# Patient Record
Sex: MALE | Race: WHITE | NOT HISPANIC OR LATINO | Employment: STUDENT | URBAN - NONMETROPOLITAN AREA
[De-identification: names, ages, dates, MRNs, and addresses within clinical notes are randomized per-mention and may not be internally consistent; named-entity substitution may affect disease eponyms.]

---

## 2019-07-09 ENCOUNTER — OFFICE VISIT (OUTPATIENT)
Dept: RETAIL CLINIC | Facility: CLINIC | Age: 24
End: 2019-07-09

## 2019-07-09 VITALS
WEIGHT: 131 LBS | RESPIRATION RATE: 16 BRPM | HEART RATE: 97 BPM | OXYGEN SATURATION: 99 % | SYSTOLIC BLOOD PRESSURE: 122 MMHG | BODY MASS INDEX: 21.8 KG/M2 | TEMPERATURE: 101.6 F | DIASTOLIC BLOOD PRESSURE: 82 MMHG

## 2019-07-09 DIAGNOSIS — J02.9 SORE THROAT: Primary | ICD-10-CM

## 2019-07-09 PROBLEM — F98.8 ADD (ATTENTION DEFICIT DISORDER) WITHOUT HYPERACTIVITY: Status: ACTIVE | Noted: 2019-07-09

## 2019-07-09 PROCEDURE — 99203 OFFICE O/P NEW LOW 30 MIN: CPT | Performed by: NURSE PRACTITIONER

## 2019-07-09 RX ORDER — ACETAMINOPHEN 325 MG/1
650 TABLET ORAL ONCE
Status: COMPLETED | OUTPATIENT
Start: 2019-07-09 | End: 2019-07-09

## 2019-07-09 RX ORDER — CEFDINIR 300 MG/1
300 CAPSULE ORAL 2 TIMES DAILY
Qty: 20 CAPSULE | Refills: 0 | Status: SHIPPED | OUTPATIENT
Start: 2019-07-09 | End: 2019-07-19

## 2019-07-09 RX ADMIN — ACETAMINOPHEN 650 MG: 325 TABLET ORAL at 12:15

## 2019-07-09 NOTE — PROGRESS NOTES
Subjective   Sedrick Coombs is a 24 y.o. male.     URI    This is a new problem. The current episode started yesterday. The problem has been gradually worsening. Maximum temperature: up to 101.2. Associated symptoms include congestion, coughing, ear pain, headaches, sinus pain and a sore throat. Pertinent negatives include no abdominal pain, chest pain, diarrhea, nausea, plugged ear sensation, rash, rhinorrhea, sneezing, swollen glands, vomiting or wheezing. Treatments tried: advil last night; nothing today. The treatment provided mild relief.        No current outpatient medications on file prior to visit.     No current facility-administered medications on file prior to visit.        Allergies   Allergen Reactions   • Amoxicillin Rash       Past Medical History:   Diagnosis Date   • ADHD (attention deficit hyperactivity disorder)    • Strep pharyngitis        Past Surgical History:   Procedure Laterality Date   • TONSILLECTOMY     • WISDOM TOOTH EXTRACTION         History reviewed. No pertinent family history.    Social History     Socioeconomic History   • Marital status: Single     Spouse name: Not on file   • Number of children: Not on file   • Years of education: Not on file   • Highest education level: Not on file   Tobacco Use   • Smoking status: Never Smoker   • Smokeless tobacco: Never Used   Substance and Sexual Activity   • Alcohol use: Yes     Comment: Socially   • Drug use: No   • Sexual activity: Defer       Review of Systems   Constitutional: Positive for activity change, chills, diaphoresis and fever. Negative for appetite change.   HENT: Positive for congestion, ear pain, sinus pain and sore throat. Negative for rhinorrhea and sneezing.    Respiratory: Positive for cough. Negative for wheezing.    Cardiovascular: Negative for chest pain.   Gastrointestinal: Negative for abdominal pain, diarrhea, nausea and vomiting.   Skin: Negative for rash.   Neurological: Positive for headaches.       /82    Pulse 97   Temp (!) 101.6 °F (38.7 °C)   Resp 16   Wt 59.4 kg (131 lb)   SpO2 99%   BMI 21.80 kg/m²     Objective   Physical Exam   Constitutional: He is oriented to person, place, and time. He appears well-developed and well-nourished. He is cooperative.   HENT:   Head: Normocephalic.   Right Ear: Tympanic membrane, external ear and ear canal normal.   Left Ear: Tympanic membrane, external ear and ear canal normal.   Nose: Right sinus exhibits no maxillary sinus tenderness and no frontal sinus tenderness. Left sinus exhibits no maxillary sinus tenderness and no frontal sinus tenderness.   Mouth/Throat: Uvula is midline and mucous membranes are normal. Oropharyngeal exudate and posterior oropharyngeal erythema present. Tonsils are 0 on the right. Tonsils are 0 on the left.   Eyes: Conjunctivae, EOM and lids are normal.   Cardiovascular: Normal rate, regular rhythm and normal heart sounds.   Pulmonary/Chest: Effort normal and breath sounds normal. No accessory muscle usage. No respiratory distress.   Lymphadenopathy:        Head (right side): Tonsillar adenopathy present.        Head (left side): Tonsillar adenopathy present.   Neurological: He is alert and oriented to person, place, and time.   Skin: Skin is warm, dry and intact.   Psychiatric: He has a normal mood and affect. His speech is normal and behavior is normal.         Assessment/Plan   Sedrick was seen today for uri.    Diagnoses and all orders for this visit:    Sore throat  -     cefdinir (OMNICEF) 300 MG capsule; Take 1 capsule by mouth 2 (Two) Times a Day for 10 days.  -     acetaminophen (TYLENOL) tablet 650 mg      Administrations This Visit     acetaminophen (TYLENOL) tablet 650 mg     Admin Date  07/09/2019 Action  Given Dose  650 mg Route  Oral Administered By  Shea Zaidi APRN                  Will treat for possible strep due to frequent history of strep as well as PE. Patient reports taking keflex in the past without  reaction.     Follow up with PCP or go to the nearest emergency room if symptoms worsen or fail to improve.

## 2024-01-31 ENCOUNTER — APPOINTMENT (OUTPATIENT)
Dept: GENERAL RADIOLOGY | Facility: HOSPITAL | Age: 29
End: 2024-01-31
Payer: COMMERCIAL

## 2024-01-31 ENCOUNTER — HOSPITAL ENCOUNTER (EMERGENCY)
Facility: HOSPITAL | Age: 29
Discharge: OTHER INSTITUTION | End: 2024-02-01
Attending: EMERGENCY MEDICINE
Payer: COMMERCIAL

## 2024-01-31 ENCOUNTER — APPOINTMENT (OUTPATIENT)
Dept: CT IMAGING | Facility: HOSPITAL | Age: 29
End: 2024-01-31
Payer: COMMERCIAL

## 2024-01-31 DIAGNOSIS — R58 RETROPERITONEAL HEMORRHAGE: Primary | ICD-10-CM

## 2024-01-31 LAB
ABO GROUP BLD: NORMAL
ABO GROUP BLD: NORMAL
ALBUMIN SERPL-MCNC: 4.6 G/DL (ref 3.5–5.2)
ALBUMIN/GLOB SERPL: 1.8 G/DL
ALP SERPL-CCNC: 91 U/L (ref 39–117)
ALT SERPL W P-5'-P-CCNC: 47 U/L (ref 1–41)
ANION GAP SERPL CALCULATED.3IONS-SCNC: 16.7 MMOL/L (ref 5–15)
AST SERPL-CCNC: 49 U/L (ref 1–40)
BASOPHILS # BLD AUTO: 0.04 10*3/MM3 (ref 0–0.2)
BASOPHILS NFR BLD AUTO: 0.2 % (ref 0–1.5)
BILIRUB SERPL-MCNC: 0.7 MG/DL (ref 0–1.2)
BLD GP AB SCN SERPL QL: NEGATIVE
BUN SERPL-MCNC: 14 MG/DL (ref 6–20)
BUN/CREAT SERPL: 17.1 (ref 7–25)
CALCIUM SPEC-SCNC: 8.7 MG/DL (ref 8.6–10.5)
CHLORIDE SERPL-SCNC: 97 MMOL/L (ref 98–107)
CO2 SERPL-SCNC: 22.3 MMOL/L (ref 22–29)
CREAT SERPL-MCNC: 0.82 MG/DL (ref 0.76–1.27)
CRP SERPL-MCNC: 3.29 MG/DL (ref 0–0.5)
D-LACTATE SERPL-SCNC: 1.1 MMOL/L (ref 0.5–2)
D-LACTATE SERPL-SCNC: 3.4 MMOL/L (ref 0.5–2)
DEPRECATED RDW RBC AUTO: 37.9 FL (ref 37–54)
EGFRCR SERPLBLD CKD-EPI 2021: 121.2 ML/MIN/1.73
EOSINOPHIL # BLD AUTO: 0.01 10*3/MM3 (ref 0–0.4)
EOSINOPHIL NFR BLD AUTO: 0.1 % (ref 0.3–6.2)
ERYTHROCYTE [DISTWIDTH] IN BLOOD BY AUTOMATED COUNT: 13 % (ref 12.3–15.4)
ERYTHROCYTE [SEDIMENTATION RATE] IN BLOOD: 5 MM/HR (ref 0–15)
FLUAV RNA RESP QL NAA+PROBE: DETECTED
FLUBV RNA RESP QL NAA+PROBE: NOT DETECTED
GEN 5 2HR TROPONIN T REFLEX: 54 NG/L
GLOBULIN UR ELPH-MCNC: 2.6 GM/DL
GLUCOSE SERPL-MCNC: 129 MG/DL (ref 65–99)
HCT VFR BLD AUTO: 25.3 % (ref 37.5–51)
HCT VFR BLD AUTO: 27.7 % (ref 37.5–51)
HCT VFR BLD AUTO: 31.8 % (ref 37.5–51)
HGB BLD-MCNC: 11.3 G/DL (ref 13–17.7)
HGB BLD-MCNC: 9.2 G/DL (ref 13–17.7)
HGB BLD-MCNC: 9.8 G/DL (ref 13–17.7)
HOLD SPECIMEN: NORMAL
HOLD SPECIMEN: NORMAL
IMM GRANULOCYTES # BLD AUTO: 0.16 10*3/MM3 (ref 0–0.05)
IMM GRANULOCYTES NFR BLD AUTO: 0.8 % (ref 0–0.5)
LIPASE SERPL-CCNC: 10 U/L (ref 13–60)
LYMPHOCYTES # BLD AUTO: 1.38 10*3/MM3 (ref 0.7–3.1)
LYMPHOCYTES NFR BLD AUTO: 7.3 % (ref 19.6–45.3)
MCH RBC QN AUTO: 28.8 PG (ref 26.6–33)
MCHC RBC AUTO-ENTMCNC: 35.5 G/DL (ref 31.5–35.7)
MCV RBC AUTO: 80.9 FL (ref 79–97)
MONOCYTES # BLD AUTO: 0.98 10*3/MM3 (ref 0.1–0.9)
MONOCYTES NFR BLD AUTO: 5.2 % (ref 5–12)
NEUTROPHILS NFR BLD AUTO: 16.41 10*3/MM3 (ref 1.7–7)
NEUTROPHILS NFR BLD AUTO: 86.4 % (ref 42.7–76)
NRBC BLD AUTO-RTO: 0 /100 WBC (ref 0–0.2)
NT-PROBNP SERPL-MCNC: 258.7 PG/ML (ref 0–450)
PLATELET # BLD AUTO: 322 10*3/MM3 (ref 140–450)
PMV BLD AUTO: 11.5 FL (ref 6–12)
POTASSIUM SERPL-SCNC: 4.2 MMOL/L (ref 3.5–5.2)
PROCALCITONIN SERPL-MCNC: 0.5 NG/ML (ref 0–0.25)
PROT SERPL-MCNC: 7.2 G/DL (ref 6–8.5)
RBC # BLD AUTO: 3.93 10*6/MM3 (ref 4.14–5.8)
RH BLD: POSITIVE
RH BLD: POSITIVE
SARS-COV-2 RNA RESP QL NAA+PROBE: NOT DETECTED
SODIUM SERPL-SCNC: 136 MMOL/L (ref 136–145)
T&S EXPIRATION DATE: NORMAL
TROPONIN T DELTA: -14 NG/L
TROPONIN T SERPL HS-MCNC: 68 NG/L
WBC NRBC COR # BLD AUTO: 18.98 10*3/MM3 (ref 3.4–10.8)
WHOLE BLOOD HOLD COAG: NORMAL
WHOLE BLOOD HOLD SPECIMEN: NORMAL

## 2024-01-31 PROCEDURE — 96361 HYDRATE IV INFUSION ADD-ON: CPT

## 2024-01-31 PROCEDURE — 71275 CT ANGIOGRAPHY CHEST: CPT

## 2024-01-31 PROCEDURE — 25010000002 HYDROMORPHONE 1 MG/ML SOLUTION: Performed by: STUDENT IN AN ORGANIZED HEALTH CARE EDUCATION/TRAINING PROGRAM

## 2024-01-31 PROCEDURE — 86901 BLOOD TYPING SEROLOGIC RH(D): CPT | Performed by: PHYSICIAN ASSISTANT

## 2024-01-31 PROCEDURE — 96376 TX/PRO/DX INJ SAME DRUG ADON: CPT

## 2024-01-31 PROCEDURE — 85652 RBC SED RATE AUTOMATED: CPT | Performed by: EMERGENCY MEDICINE

## 2024-01-31 PROCEDURE — 86140 C-REACTIVE PROTEIN: CPT | Performed by: EMERGENCY MEDICINE

## 2024-01-31 PROCEDURE — P9016 RBC LEUKOCYTES REDUCED: HCPCS

## 2024-01-31 PROCEDURE — 93005 ELECTROCARDIOGRAM TRACING: CPT | Performed by: EMERGENCY MEDICINE

## 2024-01-31 PROCEDURE — 99285 EMERGENCY DEPT VISIT HI MDM: CPT

## 2024-01-31 PROCEDURE — 83690 ASSAY OF LIPASE: CPT | Performed by: EMERGENCY MEDICINE

## 2024-01-31 PROCEDURE — 86850 RBC ANTIBODY SCREEN: CPT | Performed by: PHYSICIAN ASSISTANT

## 2024-01-31 PROCEDURE — 85025 COMPLETE CBC W/AUTO DIFF WBC: CPT | Performed by: EMERGENCY MEDICINE

## 2024-01-31 PROCEDURE — 80053 COMPREHEN METABOLIC PANEL: CPT | Performed by: EMERGENCY MEDICINE

## 2024-01-31 PROCEDURE — 86900 BLOOD TYPING SEROLOGIC ABO: CPT

## 2024-01-31 PROCEDURE — 25010000002 MORPHINE PER 10 MG: Performed by: EMERGENCY MEDICINE

## 2024-01-31 PROCEDURE — 86901 BLOOD TYPING SEROLOGIC RH(D): CPT

## 2024-01-31 PROCEDURE — 83880 ASSAY OF NATRIURETIC PEPTIDE: CPT | Performed by: EMERGENCY MEDICINE

## 2024-01-31 PROCEDURE — 25810000003 SODIUM CHLORIDE 0.9 % SOLUTION: Performed by: PHYSICIAN ASSISTANT

## 2024-01-31 PROCEDURE — 96375 TX/PRO/DX INJ NEW DRUG ADDON: CPT

## 2024-01-31 PROCEDURE — 93005 ELECTROCARDIOGRAM TRACING: CPT

## 2024-01-31 PROCEDURE — 86920 COMPATIBILITY TEST SPIN: CPT

## 2024-01-31 PROCEDURE — 25010000002 ONDANSETRON PER 1 MG: Performed by: EMERGENCY MEDICINE

## 2024-01-31 PROCEDURE — 71045 X-RAY EXAM CHEST 1 VIEW: CPT

## 2024-01-31 PROCEDURE — 36430 TRANSFUSION BLD/BLD COMPNT: CPT

## 2024-01-31 PROCEDURE — 36415 COLL VENOUS BLD VENIPUNCTURE: CPT

## 2024-01-31 PROCEDURE — 87636 SARSCOV2 & INF A&B AMP PRB: CPT | Performed by: PHYSICIAN ASSISTANT

## 2024-01-31 PROCEDURE — 85014 HEMATOCRIT: CPT | Performed by: PHYSICIAN ASSISTANT

## 2024-01-31 PROCEDURE — 85018 HEMOGLOBIN: CPT | Performed by: PHYSICIAN ASSISTANT

## 2024-01-31 PROCEDURE — 74174 CTA ABD&PLVS W/CONTRAST: CPT

## 2024-01-31 PROCEDURE — 84145 PROCALCITONIN (PCT): CPT | Performed by: PHYSICIAN ASSISTANT

## 2024-01-31 PROCEDURE — 25510000001 IOPAMIDOL 61 % SOLUTION: Performed by: EMERGENCY MEDICINE

## 2024-01-31 PROCEDURE — 96374 THER/PROPH/DIAG INJ IV PUSH: CPT

## 2024-01-31 PROCEDURE — 25010000002 HYDROMORPHONE 1 MG/ML SOLUTION: Performed by: EMERGENCY MEDICINE

## 2024-01-31 PROCEDURE — 83605 ASSAY OF LACTIC ACID: CPT | Performed by: PHYSICIAN ASSISTANT

## 2024-01-31 PROCEDURE — 84484 ASSAY OF TROPONIN QUANT: CPT | Performed by: EMERGENCY MEDICINE

## 2024-01-31 PROCEDURE — 86900 BLOOD TYPING SEROLOGIC ABO: CPT | Performed by: PHYSICIAN ASSISTANT

## 2024-01-31 RX ORDER — AZATHIOPRINE 50 MG/1
50 TABLET ORAL DAILY
COMMUNITY
Start: 2023-08-23

## 2024-01-31 RX ORDER — ONDANSETRON 2 MG/ML
4 INJECTION INTRAMUSCULAR; INTRAVENOUS ONCE
Status: COMPLETED | OUTPATIENT
Start: 2024-01-31 | End: 2024-01-31

## 2024-01-31 RX ORDER — SODIUM CHLORIDE 0.9 % (FLUSH) 0.9 %
10 SYRINGE (ML) INJECTION AS NEEDED
Status: DISCONTINUED | OUTPATIENT
Start: 2024-01-31 | End: 2024-02-01 | Stop reason: HOSPADM

## 2024-01-31 RX ADMIN — MORPHINE SULFATE 4 MG: 4 INJECTION, SOLUTION INTRAMUSCULAR; INTRAVENOUS at 12:02

## 2024-01-31 RX ADMIN — SODIUM CHLORIDE 1000 ML: 9 INJECTION, SOLUTION INTRAVENOUS at 14:49

## 2024-01-31 RX ADMIN — IOPAMIDOL 100 ML: 612 INJECTION, SOLUTION INTRAVENOUS at 12:53

## 2024-01-31 RX ADMIN — IOPAMIDOL 100 ML: 612 INJECTION, SOLUTION INTRAVENOUS at 13:33

## 2024-01-31 RX ADMIN — HYDROMORPHONE HYDROCHLORIDE 1 MG: 1 INJECTION, SOLUTION INTRAMUSCULAR; INTRAVENOUS; SUBCUTANEOUS at 13:16

## 2024-01-31 RX ADMIN — HYDROMORPHONE HYDROCHLORIDE 1 MG: 1 INJECTION, SOLUTION INTRAMUSCULAR; INTRAVENOUS; SUBCUTANEOUS at 21:41

## 2024-01-31 RX ADMIN — HYDROMORPHONE HYDROCHLORIDE 1 MG: 1 INJECTION, SOLUTION INTRAMUSCULAR; INTRAVENOUS; SUBCUTANEOUS at 18:00

## 2024-01-31 RX ADMIN — ONDANSETRON 4 MG: 2 INJECTION INTRAMUSCULAR; INTRAVENOUS at 12:02

## 2024-01-31 NOTE — ED NOTES
[facility] returned call and asked to speak with Charles RAZO. Call transferred to Charles RAZO at this time

## 2024-01-31 NOTE — ED NOTES
Contacted [facility] per Charles RAZO request. Awaiting call back at this time.

## 2024-01-31 NOTE — ED PROVIDER NOTES
"Subjective  History of Present Illness:    Chief Complaint: Shortness of air  History of Present Illness: 28-year-old male presenting to the emergency department with complaints of shortness of air that began last night.  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  He is experiencing chest and shoulder pain with coughing and movement.  He describes the pain as shooting.  States he feels like his heart is racing.  He mentions he was experiencing flulike symptoms last week, however now resolved.  Onset: Sudden  Duration: Started last night  Exacerbating / Alleviating factors: Pain and SOA coughing and movement  Associated symptoms:       Nurses Notes reviewed and agree, including vitals, allergies, social history and prior medical history.     REVIEW OF SYSTEMS: All systems reviewed and not pertinent unless noted.    Review of Systems   Respiratory:  Positive for chest tightness and shortness of breath.    All other systems reviewed and are negative.      Past Medical History:   Diagnosis Date    ADHD (attention deficit hyperactivity disorder)     Strep pharyngitis        Allergies:    Amoxicillin      Past Surgical History:   Procedure Laterality Date    TONSILLECTOMY      WISDOM TOOTH EXTRACTION           Social History     Socioeconomic History    Marital status: Single   Tobacco Use    Smoking status: Never    Smokeless tobacco: Never   Substance and Sexual Activity    Alcohol use: Yes     Comment: Socially    Drug use: No    Sexual activity: Defer         History reviewed. No pertinent family history.    Objective  Physical Exam:  /75   Pulse (!) 123   Temp 98.1 °F (36.7 °C) (Oral)   Resp 18   Ht 165.1 cm (65\")   Wt 63.5 kg (140 lb)   SpO2 93%   BMI 23.30 kg/m²      Physical Exam  Vitals and nursing note reviewed.   Constitutional:       Appearance: He is " well-developed.   HENT:      Head: Normocephalic and atraumatic.      Mouth/Throat:      Mouth: Mucous membranes are moist.   Eyes:      Extraocular Movements: Extraocular movements intact.   Cardiovascular:      Rate and Rhythm: Normal rate and regular rhythm.   Pulmonary:      Effort: Pulmonary effort is normal.      Breath sounds: Normal breath sounds.   Abdominal:      Palpations: Abdomen is soft.   Musculoskeletal:         General: Normal range of motion.      Cervical back: Normal range of motion.   Skin:     General: Skin is warm and dry.   Neurological:      Mental Status: He is alert and oriented to person, place, and time.   Psychiatric:         Behavior: Behavior normal.         Thought Content: Thought content normal.         Judgment: Judgment normal.           Procedures    ED Course:    ED Course as of 01/31/24 2234 Wed Jan 31, 2024   1133 EKG interpreted by me: Sinus tachycardia, no acute ST elevations, some nonspecific T waves, this is an abnormal EKG [MP]   1330 Received a call from radiology, there is concern for blood intra-abdominal he, patient was sent back over for CT abdomen pelvis [CS]   1348 Discussed the case with Dr. Carrizales, he reviewed the CT scans as well, will reassess after CTA abdomen pelvis performed [CS]   1402 Received a call from radiologist, patient has intraperitoneal and retroperitoneal blood, no obvious source active extravasation seen, CT scans power shared with , reaching out to [facility] for transfer [CS]   1420 Discussed the case with Dr. Torres  transfer center patient does have stable vitals, no active extract, they are on divert and cannot accept the patient at this time [CS]   7534 Received a call from [facility], the physician was from interventional radiology, he will speak to his attending from interventional radiology and call back [CS]   1619 The case with Dr. Higginbotham, interventional radiologist, he thought he would be  available as a consult, he will be I will speak to general surgery as well as the intensivist as the primary team before excepting. [CS]   1750 Dr. Hunter at [facility] general surgery recommended reaching out to [facility], and calling back [CS]   1801 Discussed with Dr. Lee, General Surgery at [facility], he felt the patient would benefit from a tertiary center, higher level care, academic center. [CS]   1806 I have reached back out to [facility] since the patient's hemoglobin hematocrit have dropped and this would indicate less stability. [CS]   1812 Discussed with Dr. Torres,  transfer hospitalist, despite the patient's hemoglobin hematocrit dropping, they are unable to accept this patient as a transfer. [CS]   1849 Discussed the case with Dr. Hunter [facility] surgery he agreed to accept the patient and recommended admitting to the ICU, I spoke to Dr. arce with the ICU, she excepted on behalf of Dr. Sims, we are waiting a bed assignment [CS]      ED Course User Index  [CS] Charles Leach Jr., DANNI  [MP] Jose D Carrizales MD       Lab Results (last 24 hours)       Procedure Component Value Units Date/Time    CBC & Differential [[id number]]  (Abnormal) Collected: 01/31/24 1120    Specimen: Blood Updated: 01/31/24 1212    Narrative:      The following orders were created for panel order CBC & Differential.  Procedure                               Abnormality         Status                     ---------                               -----------         ------                     CBC Auto Differential[[id number]]        Abnormal            Final result               Scan Slide[[id number]]                                                                    Please view results for these tests on the individual orders.    Comprehensive Metabolic Panel [[id number]]  (Abnormal) Collected: 01/31/24 1120     Specimen: Blood Updated: 01/31/24 1144     Glucose 129 mg/dL      BUN 14 mg/dL      Creatinine 0.82 mg/dL      Sodium 136 mmol/L      Potassium 4.2 mmol/L      Chloride 97 mmol/L      CO2 22.3 mmol/L      Calcium 8.7 mg/dL      Total Protein 7.2 g/dL      Albumin 4.6 g/dL      ALT (SGPT) 47 U/L      AST (SGOT) 49 U/L      Alkaline Phosphatase 91 U/L      Total Bilirubin 0.7 mg/dL      Globulin 2.6 gm/dL      A/G Ratio 1.8 g/dL      BUN/Creatinine Ratio 17.1     Anion Gap 16.7 mmol/L      eGFR 121.2 mL/min/1.73     Narrative:      GFR Normal >60  Chronic Kidney Disease <60  Kidney Failure <15      BNP [[id number]]  (Normal) Collected: 01/31/24 1120    Specimen: Blood Updated: 01/31/24 1147     proBNP 258.7 pg/mL     Narrative:      This assay is used as an aid in the diagnosis of individuals suspected of having heart failure. It can be used as an aid in the diagnosis of acute decompensated heart failure (ADHF) in patients presenting with signs and symptoms of ADHF to the emergency department (ED). In addition, NT-proBNP of <300 pg/mL indicates ADHF is not likely.    Age Range Result Interpretation  NT-proBNP Concentration (pg/mL:      <50             Positive            >450                   Gray                 300-450                    Negative             <300    50-75           Positive            >900                  Gray                300-900                  Negative            <300      >75             Positive            >1800                  Gray                300-1800                  Negative            <300    Single High Sensitivity Troponin T [[id number]]  (Abnormal) Collected: 01/31/24 1120    Specimen: Blood Updated: 01/31/24 1210     HS Troponin T 68 ng/L     Narrative:      High Sensitive Troponin T Reference Range:  <14.0 ng/L- Negative Female for AMI  <22.0 ng/L- Negative Male for AMI  >=14 - Abnormal Female indicating possible myocardial injury.  >=22 - Abnormal Male indicating possible  myocardial injury.   Clinicians would have to utilize clinical acumen, EKG, Troponin, and serial changes to determine if it is an Acute Myocardial Infarction or myocardial injury due to an underlying chronic condition.         CBC Auto Differential [[id number]]  (Abnormal) Collected: 01/31/24 1120    Specimen: Blood Updated: 01/31/24 1212     WBC 18.98 10*3/mm3      RBC 3.93 10*6/mm3      Hemoglobin 11.3 g/dL      Hematocrit 31.8 %      MCV 80.9 fL      MCH 28.8 pg      MCHC 35.5 g/dL      RDW 13.0 %      RDW-SD 37.9 fl      MPV 11.5 fL      Platelets 322 10*3/mm3      Neutrophil % 86.4 %      Lymphocyte % 7.3 %      Monocyte % 5.2 %      Eosinophil % 0.1 %      Basophil % 0.2 %      Immature Grans % 0.8 %      Neutrophils, Absolute 16.41 10*3/mm3      Lymphocytes, Absolute 1.38 10*3/mm3      Monocytes, Absolute 0.98 10*3/mm3      Eosinophils, Absolute 0.01 10*3/mm3      Basophils, Absolute 0.04 10*3/mm3      Immature Grans, Absolute 0.16 10*3/mm3      nRBC 0.0 /100 WBC     Sedimentation Rate [[id number]]  (Normal) Collected: 01/31/24 1120    Specimen: Blood Updated: 01/31/24 1205     Sed Rate 5 mm/hr     C-reactive Protein [[id number]]  (Abnormal) Collected: 01/31/24 1120    Specimen: Blood Updated: 01/31/24 1206     C-Reactive Protein 3.29 mg/dL     Procalcitonin [[id number]]  (Abnormal) Collected: 01/31/24 1120    Specimen: Blood Updated: 01/31/24 1300     Procalcitonin 0.50 ng/mL     Narrative:      As a Marker for Sepsis (Non-Neonates):    1. <0.5 ng/mL represents a low risk of severe sepsis and/or septic shock.  2. >2 ng/mL represents a high risk of severe sepsis and/or septic shock.    As a Marker for Lower Respiratory Tract Infections that require antibiotic therapy:    PCT on Admission    Antibiotic Therapy       6-12 Hrs later    >0.5                Strongly Recommended  >0.25 - <0.5        Recommended   0.1 - 0.25          Discouraged              Remeasure/reassess PCT  <0.1                Strongly  "Discouraged     Remeasure/reassess PCT    As 28 day mortality risk marker: \"Change in Procalcitonin Result\" (>80% or <=80%) if Day 0 (or Day 1) and Day 4 values are available. Refer to http://www.[facility]-pct-calculator.com    Change in PCT <=80%  A decrease of PCT levels below or equal to 80% defines a positive change in PCT test result representing a higher risk for 28-day all-cause mortality of patients diagnosed with severe sepsis for septic shock.    Change in PCT >80%  A decrease of PCT levels of more than 80% defines a negative change in PCT result representing a lower risk for 28-day all-cause mortality of patients diagnosed with severe sepsis or septic shock.       High Sensitivity Troponin T 2Hr [[id number]]  (Abnormal) Collected: 01/31/24 1304    Specimen: Blood Updated: 01/31/24 1359     HS Troponin T 54 ng/L      Troponin T Delta -14 ng/L     Narrative:      High Sensitive Troponin T Reference Range:  <14.0 ng/L- Negative Female for AMI  <22.0 ng/L- Negative Male for AMI  >=14 - Abnormal Female indicating possible myocardial injury.  >=22 - Abnormal Male indicating possible myocardial injury.   Clinicians would have to utilize clinical acumen, EKG, Troponin, and serial changes to determine if it is an Acute Myocardial Infarction or myocardial injury due to an underlying chronic condition.         Lactic Acid, Plasma [[id number]]  (Abnormal) Collected: 01/31/24 1304    Specimen: Blood Updated: 01/31/24 1352     Lactate 3.4 mmol/L     COVID-19 and FLU A/B PCR, 1 HR TAT - Swab, Nasopharynx [[id number]]  (Abnormal) Collected: 01/31/24 1304    Specimen: Swab from Nasopharynx Updated: 01/31/24 1328     COVID19 Not Detected     Influenza A PCR Detected     Influenza B PCR Not Detected    Narrative:      Fact sheet for providers: https://www.fda.gov/media/728046/download    Fact sheet for patients: https://www.fda.gov/media/940732/download    Test performed by PCR.    Lipase [[id number]]  (Abnormal) Collected: " 01/31/24 1304    Specimen: Blood Updated: 01/31/24 1331     Lipase 10 U/L     Hemoglobin & Hematocrit, Blood [[id number]]  (Abnormal) Collected: 01/31/24 1451    Specimen: Blood Updated: 01/31/24 1504     Hemoglobin 9.2 g/dL      Hematocrit 25.3 %     STAT Lactic Acid, Reflex [[id number]]  (Normal) Collected: 01/31/24 1624    Specimen: Blood Updated: 01/31/24 1652     Lactate 1.1 mmol/L     Hemoglobin & Hematocrit, Blood [[id number]]  (Abnormal) Collected: 01/31/24 2147    Specimen: Blood Updated: 01/31/24 2151     Hemoglobin 9.8 g/dL      Hematocrit 27.7 %              CT Angiogram Abdomen Pelvis    Result Date: 1/31/2024  PROCEDURE: CT ANGIOGRAM ABDOMEN PELVIS-  HISTORY: Bilateral shoulder pain, history of Wegener's granulomatosis, intraperitoneal hemorrhage identified on CT of the chest.  COMPARISON: CT chest earlier the same day..  PROCEDURE: Pre-contrast, Post-contrast images of the abdomen and pelvis were performed by computed tomography. Extensive 3 D reconstruction images were performed. A CTA was performed. This study was performed with techniques to keep radiation doses as low as reasonably achievable, (ALARA). Individualized dose reduction techniques using automated exposure control or adjustment of mA and/or kV according to the patient size were employed.  FINDINGS:  ABDOMEN: The lung bases demonstrate posterior medial surgical change on the right. Linear densities identified in the left lower lobe as well likely scar. The heart is proper size. The liver is unremarkable except for perihepatic fluid measuring 45 Hounsfield units consistent with blood as seen on the chest CT. Gallbladder present with no CT visible stones.. The spleen is normal in size and also has perisplenic fluid consistent with blood. Right adrenal gland appears normal. Left adrenal gland is partially obscured by hemorrhage. There is material of intermediate attenuation adjacent to the left crura of the diaphragm consistent with  hemorrhage. There is left perirenal retroperitoneal hemorrhage. Right kidney appears normal. Particular attention paid to the left renal arterial system. There are 2 nonstenotic left renal arteries. No extravasation of contrast is identified. The pancreas appears normal except for a significant amount of intermediate attenuation material consistent with hemorrhage surrounding almost the entire pancreas and extending into the central mesentery. There is also extension to the left and right paracolic gutters. The kidneys are unremarkable with significant amount of contrast in the calyces secondary to the 2 recent contrast studies. Right kidney otherwise appears normal.. Streak artifact from patient's arm position noted.  PELVIS: The appendix is not identified. The urinary bladder is contains a moderate amount of contrast and is otherwise unremarkable. Prostate is normal in size. High attenuation fluid is noted in the pelvis also consistent with intraperitoneal hemorrhage..  CTA: The abdominal aorta is proper caliber. The SMA, celiac, and BERNA are patent. There is no significant stenosis or calcification. There is a single right nonstenotic renal artery. This appears normal. There is an aneurysm of the left common iliac artery extending for a distance of 3.6 cm and measuring up to 17 mm in transverse diameter. This has a mildly lobulated shape. No area of extravasation identified in the abdomen and pelvis with particular attention paid to the left kidney and the region of the left common iliac aneurysm. The most prominent hemorrhage appears to be located in the left upper quadrant from the left hemidiaphragm to the level of the inferior pole of the left kidney.      Impression: Significant left retroperitoneal and intraperitoneal hemorrhage with no focus of extravasation identified;  in the emergency room notified at 2:00 p.m. January 31, 2024.  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    CTDI: DLP:286.02 mGy.cm  This report was signed and finalized on 1/31/2024 2:07 PM by Krystin Neri MD.      CT Angiogram Chest Pulmonary Embolism    Result Date: 1/31/2024  PROCEDURE: CT ANGIOGRAM CHEST PULMONARY EMBOLISM-  HISTORY: h/o of wegeners ganulamatosis, pain on the top of both shoulders, shortness of breath, history of pneumothorax.  COMPARISON: None.  TECHNIQUE: The patient was injected with  IV contrast. Axial images were obtained through the chest in a CTA/ PE protocol. 3 D Reconstruction images were also performed. This study was performed with techniques to keep radiation doses as low as reasonably achievable, (ALARA). Individualized dose reduction techniques using automated exposure control or adjustment of mA and/or kV according to the patient size were employed.  FINDINGS: There is no axillary adenopathy. There is no hilar or mediastinal adenopathy.  The heart is proper size. There is no pericardial or pleural effusion. No filling defects are identified to suggest PE. There is no evidence of thoracic aortic aneurysm or dissection. Limited images of the upper abdomen demonstrate perihepatic and perisplenic fluid measuring 35-40 Hounsfield units. There is material of intermediate attenuation posterior medially in the left upper quadrant with significant left perirenal stranding and no right perirenal stranding in the visualized portions of the kidneys. There is also material of intermediate attenuation around the pancreas findings are concerning for hemorrhage in this patient with a history of Wegener's, polyangiitis. Visualized portions of the renal arteries appear normal. There is postsurgical change posterior medial right lower lobe with calcification, staple line and material of intermediate attenuation, possible scar tissue; no prior chest CT available for comparison. Minimal linear densities seen very laterally in the left lower lobe and also posteriorly in the left lower lobe most consistent  with scar. No area of consolidation seen. Mild emphysematous change noted. Findings discussed with  in the emergency room at 1:20 p.m. January 31, 2024 and CTA abdomen pelvis recommended.      Impression: No evidence of pulmonary embolism.  Mild bilateral scarring with postsurgical change on the right.  Mild emphysematous change.  Findings concerning for intraperitoneal hemorrhage which may be centered around the left kidney in this patient with a history of Wegener's granulomatosis; CTA abdomen/pelvis recommended.  in the emergency room notified.   CTDI: 5.18 mGy DLP:186.79 mGy.cm  This report was signed and finalized on 1/31/2024 1:27 PM by Krystin Neri MD.      XR Chest 1 View    Result Date: 1/31/2024  PROCEDURE: XR CHEST 1 VW-    HISTORY: SOA Triage Protocol  COMPARISON: None.  FINDINGS: The heart is normal in size. The mediastinum is unremarkable. A few linear densities in the left lung base may represent atelectasis versus scar. No prior exam is available for comparison. There is no pneumothorax. There are no acute osseous abnormalities.      Impression: Atelectasis versus scarring in the left lung base.    Images were reviewed, interpreted, and dictated by Dr. Krystin Neri MD Transcribed by Naheed Thibodeaux PA-C.  This report was signed and finalized on 1/31/2024 12:56 PM by Krystin Neri MD.          Medical Decision Making  Problems Addressed:  Retroperitoneal hemorrhage: complicated acute illness or injury    Amount and/or Complexity of Data Reviewed  External Data Reviewed: labs, radiology, ECG and notes.  Labs: ordered. Decision-making details documented in ED Course.  Radiology: ordered.  ECG/medicine tests: ordered. Decision-making details documented in ED Course.    Risk  Prescription drug management.          Final diagnoses:   Retroperitoneal hemorrhage          Charles Leach Jr., PA-C  01/31/24 2545

## 2024-02-01 VITALS
RESPIRATION RATE: 18 BRPM | HEART RATE: 93 BPM | SYSTOLIC BLOOD PRESSURE: 108 MMHG | WEIGHT: 140 LBS | OXYGEN SATURATION: 98 % | DIASTOLIC BLOOD PRESSURE: 58 MMHG | TEMPERATURE: 98.7 F | BODY MASS INDEX: 23.32 KG/M2 | HEIGHT: 65 IN

## 2024-02-01 LAB
HCT VFR BLD AUTO: 25.1 % (ref 37.5–51)
HCT VFR BLD AUTO: 26.9 % (ref 37.5–51)
HCT VFR BLD AUTO: 27.6 % (ref 37.5–51)
HGB BLD-MCNC: 8.9 G/DL (ref 13–17.7)
HGB BLD-MCNC: 9.6 G/DL (ref 13–17.7)
HGB BLD-MCNC: 9.8 G/DL (ref 13–17.7)

## 2024-02-01 PROCEDURE — 85014 HEMATOCRIT: CPT | Performed by: PHYSICIAN ASSISTANT

## 2024-02-01 PROCEDURE — 25010000002 HYDROMORPHONE 1 MG/ML SOLUTION: Performed by: STUDENT IN AN ORGANIZED HEALTH CARE EDUCATION/TRAINING PROGRAM

## 2024-02-01 PROCEDURE — 85018 HEMOGLOBIN: CPT | Performed by: PHYSICIAN ASSISTANT

## 2024-02-01 PROCEDURE — 96376 TX/PRO/DX INJ SAME DRUG ADON: CPT

## 2024-02-01 RX ORDER — BENZONATATE 100 MG/1
100 CAPSULE ORAL ONCE
Status: COMPLETED | OUTPATIENT
Start: 2024-02-01 | End: 2024-02-01

## 2024-02-01 RX ADMIN — BENZONATATE 100 MG: 100 CAPSULE ORAL at 01:10

## 2024-02-01 RX ADMIN — HYDROMORPHONE HYDROCHLORIDE 1 MG: 1 INJECTION, SOLUTION INTRAMUSCULAR; INTRAVENOUS; SUBCUTANEOUS at 05:45

## 2024-02-01 NOTE — ED NOTES
[facility] called at this time for an update on bed status. States there still is no bed available for pt but they are currently waiting on transport to come and pick the discharged patients up before a bed will ever become available. They wanted to know if the patient has had any changes, handed the phone to Denisha RODRIGEZ.  Stated that they are going to see if it is possible for him to get transferred to their ED. They will call back as soon as there is an update.

## 2024-02-01 NOTE — ED NOTES
Report called to [facility] ICU nurse ELIA Sheffield @ [phone]. Called again at this time to update her that he is coming by flight ([facility]) with approx timeframes given by [facility].

## 2024-02-01 NOTE — ED NOTES
[facility] called back at this time with a bed assignment at this time and a number for report. Number given to Denisha RODRIGEZ.   - - -

## 2024-02-01 NOTE — ED NOTES
[facility] called at this time for transport. [facility] accepted at this time. ETA is about 40 minutes out. Will give a call back once they are heading this way.

## 2024-02-01 NOTE — ED NOTES
"[facility] called, stated they've had \"several ICU upgrades\" and are still working to get this patient a bed. Said they will call when they have another update.  "

## 2024-02-01 NOTE — ED NOTES
"At this time [facility] has requested update of patient's condition. States \"the last available ICU bed was filled by a patient from the floor,\" however they are trying to see if patient can be cleared to come to them through the ED. Dr langford, charge nurse updated.  " [>50% of Time Spent on Coordination of Care for  ___] : Greater than 50% of the encounter time was spent on coordination of care for [unfilled] [Time Spent: ___ minutes] : I have spent [unfilled] minutes of face to face time with the patient

## 2024-02-01 NOTE — ED NOTES
[facility] dispatch called to try and see if we could get transport at this time and they declined.

## 2024-02-01 NOTE — ED NOTES
Called [facility] at this time. States there is still no bed available at this this time, they are working on a possible discharge and will give us a call back.

## 2024-02-02 LAB
BH BB BLOOD EXPIRATION DATE: NORMAL
BH BB BLOOD TYPE BARCODE: 5100
BH BB DISPENSE STATUS: NORMAL
BH BB PRODUCT CODE: NORMAL
BH BB UNIT NUMBER: NORMAL
CROSSMATCH INTERPRETATION: NORMAL
UNIT  ABO: NORMAL
UNIT  RH: NORMAL

## 2025-03-11 NOTE — PATIENT INSTRUCTIONS
Strep Throat  Strep throat is a bacterial infection of the throat. Your health care provider may call the infection tonsillitis or pharyngitis, depending on whether there is swelling in the tonsils or at the back of the throat. Strep throat is most common during the cold months of the year in children who are 5-15 years of age, but it can happen during any season in people of any age. This infection is spread from person to person (contagious) through coughing, sneezing, or close contact.  What are the causes?  Strep throat is caused by the bacteria called Streptococcus pyogenes.  What increases the risk?  This condition is more likely to develop in:  · People who spend time in crowded places where the infection can spread easily.  · People who have close contact with someone who has strep throat.    What are the signs or symptoms?  Symptoms of this condition include:  · Fever or chills.  · Redness, swelling, or pain in the tonsils or throat.  · Pain or difficulty when swallowing.  · White or yellow spots on the tonsils or throat.  · Swollen, tender glands in the neck or under the jaw.  · Red rash all over the body (rare).    How is this diagnosed?  This condition is diagnosed by performing a rapid strep test or by taking a swab of your throat (throat culture test). Results from a rapid strep test are usually ready in a few minutes, but throat culture test results are available after one or two days.  How is this treated?  This condition is treated with antibiotic medicine.  Follow these instructions at home:  Medicines  · Take over-the-counter and prescription medicines only as told by your health care provider.  · Take your antibiotic as told by your health care provider. Do not stop taking the antibiotic even if you start to feel better.  · Have family members who also have a sore throat or fever tested for strep throat. They may need antibiotics if they have the strep infection.  Eating and drinking  · Do not  [Medication Risks Reviewed] : Medication risks reviewed share food, drinking cups, or personal items that could cause the infection to spread to other people.  · If swallowing is difficult, try eating soft foods until your sore throat feels better.  · Drink enough fluid to keep your urine clear or pale yellow.  General instructions  · Gargle with a salt-water mixture 3-4 times per day or as needed. To make a salt-water mixture, completely dissolve ½-1 tsp of salt in 1 cup of warm water.  · Make sure that all household members wash their hands well.  · Get plenty of rest.  · Stay home from school or work until you have been taking antibiotics for 24 hours.  · Keep all follow-up visits as told by your health care provider. This is important.  Contact a health care provider if:  · The glands in your neck continue to get bigger.  · You develop a rash, cough, or earache.  · You cough up a thick liquid that is green, yellow-brown, or bloody.  · You have pain or discomfort that does not get better with medicine.  · Your problems seem to be getting worse rather than better.  · You have a fever.  Get help right away if:  · You have new symptoms, such as vomiting, severe headache, stiff or painful neck, chest pain, or shortness of breath.  · You have severe throat pain, drooling, or changes in your voice.  · You have swelling of the neck, or the skin on the neck becomes red and tender.  · You have signs of dehydration, such as fatigue, dry mouth, and decreased urination.  · You become increasingly sleepy, or you cannot wake up completely.  · Your joints become red or painful.  This information is not intended to replace advice given to you by your health care provider. Make sure you discuss any questions you have with your health care provider.  Document Released: 12/15/2001 Document Revised: 08/16/2017 Document Reviewed: 04/11/2016  [employer] Interactive Patient Education © 2019 [employer]     [de-identified] : Prescriptions renewed. Opioid agreement/obtained on chart [place]  reviewed and appropriate. SOAPP-R completed on chart. The patient's medications are documented to the best of their ability. Quality of life and functional ability improved on medications. The patient is showing no aberrant behavior or evidence of diversion. The patient was advised not to use narcotic medication while operating an automobile or heavy machinery due to potential sedation or dizziness. The patient was educated to the risks associated with potential opioid dependence and addiction. Urine toxicology screens as per office protocol. Use of multimodal analgesia used prn. Follow up one month.